# Patient Record
Sex: MALE | Race: WHITE | Employment: OTHER | URBAN - METROPOLITAN AREA
[De-identification: names, ages, dates, MRNs, and addresses within clinical notes are randomized per-mention and may not be internally consistent; named-entity substitution may affect disease eponyms.]

---

## 2017-01-06 ENCOUNTER — IMPORTED ENCOUNTER (OUTPATIENT)
Dept: URBAN - METROPOLITAN AREA CLINIC 9 | Facility: CLINIC | Age: 72
End: 2017-01-06

## 2017-02-03 ENCOUNTER — IMPORTED ENCOUNTER (OUTPATIENT)
Dept: URBAN - METROPOLITAN AREA CLINIC 9 | Facility: CLINIC | Age: 72
End: 2017-02-03

## 2017-03-02 ENCOUNTER — IMPORTED ENCOUNTER (OUTPATIENT)
Dept: URBAN - METROPOLITAN AREA CLINIC 9 | Facility: CLINIC | Age: 72
End: 2017-03-02

## 2017-03-03 ENCOUNTER — IMPORTED ENCOUNTER (OUTPATIENT)
Dept: URBAN - METROPOLITAN AREA CLINIC 9 | Facility: CLINIC | Age: 72
End: 2017-03-03

## 2017-04-19 ENCOUNTER — IMPORTED ENCOUNTER (OUTPATIENT)
Dept: URBAN - METROPOLITAN AREA CLINIC 9 | Facility: CLINIC | Age: 72
End: 2017-04-19

## 2017-05-17 ENCOUNTER — IMPORTED ENCOUNTER (OUTPATIENT)
Dept: URBAN - METROPOLITAN AREA CLINIC 9 | Facility: CLINIC | Age: 72
End: 2017-05-17

## 2017-06-08 ENCOUNTER — IMPORTED ENCOUNTER (OUTPATIENT)
Dept: URBAN - METROPOLITAN AREA CLINIC 9 | Facility: CLINIC | Age: 72
End: 2017-06-08

## 2017-06-14 ENCOUNTER — IMPORTED ENCOUNTER (OUTPATIENT)
Dept: URBAN - METROPOLITAN AREA CLINIC 9 | Facility: CLINIC | Age: 72
End: 2017-06-14

## 2017-07-12 ENCOUNTER — IMPORTED ENCOUNTER (OUTPATIENT)
Dept: URBAN - METROPOLITAN AREA CLINIC 9 | Facility: CLINIC | Age: 72
End: 2017-07-12

## 2017-08-09 ENCOUNTER — IMPORTED ENCOUNTER (OUTPATIENT)
Dept: URBAN - METROPOLITAN AREA CLINIC 9 | Facility: CLINIC | Age: 72
End: 2017-08-09

## 2017-09-13 ENCOUNTER — IMPORTED ENCOUNTER (OUTPATIENT)
Dept: URBAN - METROPOLITAN AREA CLINIC 9 | Facility: CLINIC | Age: 72
End: 2017-09-13

## 2017-10-11 ENCOUNTER — IMPORTED ENCOUNTER (OUTPATIENT)
Dept: URBAN - METROPOLITAN AREA CLINIC 9 | Facility: CLINIC | Age: 72
End: 2017-10-11

## 2017-10-12 ENCOUNTER — IMPORTED ENCOUNTER (OUTPATIENT)
Dept: URBAN - METROPOLITAN AREA CLINIC 9 | Facility: CLINIC | Age: 72
End: 2017-10-12

## 2017-11-08 ENCOUNTER — IMPORTED ENCOUNTER (OUTPATIENT)
Dept: URBAN - METROPOLITAN AREA CLINIC 9 | Facility: CLINIC | Age: 72
End: 2017-11-08

## 2017-12-06 ENCOUNTER — IMPORTED ENCOUNTER (OUTPATIENT)
Dept: URBAN - METROPOLITAN AREA CLINIC 9 | Facility: CLINIC | Age: 72
End: 2017-12-06

## 2018-01-10 ENCOUNTER — IMPORTED ENCOUNTER (OUTPATIENT)
Dept: URBAN - METROPOLITAN AREA CLINIC 9 | Facility: CLINIC | Age: 73
End: 2018-01-10

## 2018-02-07 ENCOUNTER — IMPORTED ENCOUNTER (OUTPATIENT)
Dept: URBAN - METROPOLITAN AREA CLINIC 9 | Facility: CLINIC | Age: 73
End: 2018-02-07

## 2018-03-07 ENCOUNTER — IMPORTED ENCOUNTER (OUTPATIENT)
Dept: URBAN - METROPOLITAN AREA CLINIC 9 | Facility: CLINIC | Age: 73
End: 2018-03-07

## 2018-04-04 ENCOUNTER — IMPORTED ENCOUNTER (OUTPATIENT)
Dept: URBAN - METROPOLITAN AREA CLINIC 9 | Facility: CLINIC | Age: 73
End: 2018-04-04

## 2018-04-12 ENCOUNTER — IMPORTED ENCOUNTER (OUTPATIENT)
Dept: URBAN - METROPOLITAN AREA CLINIC 9 | Facility: CLINIC | Age: 73
End: 2018-04-12

## 2018-05-02 ENCOUNTER — IMPORTED ENCOUNTER (OUTPATIENT)
Dept: URBAN - METROPOLITAN AREA CLINIC 9 | Facility: CLINIC | Age: 73
End: 2018-05-02

## 2018-05-30 ENCOUNTER — IMPORTED ENCOUNTER (OUTPATIENT)
Dept: URBAN - METROPOLITAN AREA CLINIC 9 | Facility: CLINIC | Age: 73
End: 2018-05-30

## 2018-07-13 ENCOUNTER — IMPORTED ENCOUNTER (OUTPATIENT)
Dept: URBAN - METROPOLITAN AREA CLINIC 9 | Facility: CLINIC | Age: 73
End: 2018-07-13

## 2018-08-10 ENCOUNTER — IMPORTED ENCOUNTER (OUTPATIENT)
Dept: URBAN - METROPOLITAN AREA CLINIC 9 | Facility: CLINIC | Age: 73
End: 2018-08-10

## 2018-09-07 ENCOUNTER — IMPORTED ENCOUNTER (OUTPATIENT)
Dept: URBAN - METROPOLITAN AREA CLINIC 9 | Facility: CLINIC | Age: 73
End: 2018-09-07

## 2018-09-18 NOTE — PATIENT DISCUSSION
New Prescription: Besivance (besifloxacin): drops,suspension: 0.6% 1 drop every two hours while awake into right eye 09-

## 2018-09-18 NOTE — PATIENT DISCUSSION
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 START BESIVANCE OD Q 2 HOURS. MIGHT NEED TO START VALTREX/ZIRGAN IN 2 DAYS IF NO BETTER.

## 2018-09-20 NOTE — PATIENT DISCUSSION
Continue: Besivance (besifloxacin): drops,suspension: 0.6% 1 drop every two hours while awake into right eye 09-

## 2018-10-04 NOTE — PATIENT DISCUSSION
Surgery Drop Counseling:  I have prescribed Imprimis prednisolone acetate-gatifloxacin-bromfenac combination drop for use as directed before and after cataract surgery.

## 2018-10-04 NOTE — PATIENT DISCUSSION
PATIENT DESIRES STANDARD LENS OD FOR CAT SX. PATIENT UNDERSTANDS HE WILL NEED GLASSES FOR DISTANCE DUE TO UNCORRECTED ASTIGMATISM AND FOR READING.

## 2018-10-04 NOTE — PATIENT DISCUSSION
Surgery Counseling: I have discussed the option of scheduling cataract surgery versus following, as well as the risks, benefits and alternatives of surgery with the patient. It was explained that the surgery is elective, there is no rush and there is no harm in waiting to have surgery. It was also explained that there is no guarantee that removing the cataract will improve their vision. The patient understands and desires to proceed with cataract surgery with implantation of an intraocular lens  to improve vision for driving.

## 2018-10-05 ENCOUNTER — IMPORTED ENCOUNTER (OUTPATIENT)
Dept: URBAN - METROPOLITAN AREA CLINIC 9 | Facility: CLINIC | Age: 73
End: 2018-10-05

## 2018-10-25 NOTE — PATIENT DISCUSSION
LEFT EYE CATARACT SURGERY - PRE-OP INSTRUCTIONS:  I have reviewed the indications, alternatives, and risks of the procedure with the patient. These risks include partial or total loss of vision, infection in the eye, pain, and the need for additional surgery for complications including a broken posterior capsule, inability to completely remove the cataract, dropped nucleus, infection, or retinal detachment. The patient has given informed consent by signing the standard consent form. We will proceed with cataract surgery as planned. The patient has also been instructed in the usual pre-operative regimen including the need to use the antibiotic (Vigamox/Ofloxacin) drops 4 times a day starting 3 days prior to surgery, and Prolensa 1 time a day starting 3 days prior to surgery (7 days before for diabetic patients), and the need to avoid eating or drinking anything except prescription medications after midnight on the morning of surgery. The patient was given the pre-printed Cataract Surgery Instructions' handout. The contents were carefully reviewed with the patient.

## 2018-10-25 NOTE — PATIENT DISCUSSION
Pre-Op 2nd Eye Counseling: The patient has noticed an improvement in their visual symptoms in the operative eye. The patient complains of decreased vision in the fellow eye when driving. It was explained to the patient that the decision to proceed with cataract surgery in the fellow eye is entirely a separate decision from the surgical eye. All of the same risks, benefits and alternatives ere reviewed with the patient again. The patient does feel the vision in the non-operative eye is limiting their daily activities and elects to proceed with cataract surgery in the left eye.

## 2018-10-25 NOTE — PATIENT DISCUSSION
Continue: Besivance (besifloxacin): drops,suspension: 0.6% 1 drop four times a day into right eye 10-

## 2018-10-29 NOTE — PATIENT DISCUSSION
Continue: prednisolone acetate (prednisolone acetate): drops,suspension: 1% 1 drop four times a day into right eye 10-

## 2018-11-21 ENCOUNTER — IMPORTED ENCOUNTER (OUTPATIENT)
Dept: URBAN - METROPOLITAN AREA CLINIC 9 | Facility: CLINIC | Age: 73
End: 2018-11-21

## 2018-11-29 ENCOUNTER — IMPORTED ENCOUNTER (OUTPATIENT)
Dept: URBAN - METROPOLITAN AREA CLINIC 9 | Facility: CLINIC | Age: 73
End: 2018-11-29

## 2019-01-02 ENCOUNTER — IMPORTED ENCOUNTER (OUTPATIENT)
Dept: URBAN - METROPOLITAN AREA CLINIC 9 | Facility: CLINIC | Age: 74
End: 2019-01-02

## 2019-02-13 ENCOUNTER — IMPORTED ENCOUNTER (OUTPATIENT)
Dept: URBAN - METROPOLITAN AREA CLINIC 9 | Facility: CLINIC | Age: 74
End: 2019-02-13

## 2019-04-10 ENCOUNTER — IMPORTED ENCOUNTER (OUTPATIENT)
Dept: URBAN - METROPOLITAN AREA CLINIC 9 | Facility: CLINIC | Age: 74
End: 2019-04-10

## 2019-04-25 ENCOUNTER — IMPORTED ENCOUNTER (OUTPATIENT)
Dept: URBAN - METROPOLITAN AREA CLINIC 9 | Facility: CLINIC | Age: 74
End: 2019-04-25

## 2019-06-05 ENCOUNTER — IMPORTED ENCOUNTER (OUTPATIENT)
Dept: URBAN - METROPOLITAN AREA CLINIC 9 | Facility: CLINIC | Age: 74
End: 2019-06-05

## 2019-07-31 ENCOUNTER — IMPORTED ENCOUNTER (OUTPATIENT)
Dept: URBAN - METROPOLITAN AREA CLINIC 9 | Facility: CLINIC | Age: 74
End: 2019-07-31

## 2019-09-12 ENCOUNTER — IMPORTED ENCOUNTER (OUTPATIENT)
Dept: URBAN - METROPOLITAN AREA CLINIC 9 | Facility: CLINIC | Age: 74
End: 2019-09-12

## 2019-09-25 ENCOUNTER — IMPORTED ENCOUNTER (OUTPATIENT)
Dept: URBAN - METROPOLITAN AREA CLINIC 9 | Facility: CLINIC | Age: 74
End: 2019-09-25

## 2019-11-05 NOTE — PATIENT DISCUSSION
Continue: prednisolone acetate (prednisolone acetate): drops,suspension: 1% 1 drop four times a day into right eye 10- family, neurology, Psych,PMD

## 2019-11-20 ENCOUNTER — IMPORTED ENCOUNTER (OUTPATIENT)
Dept: URBAN - METROPOLITAN AREA CLINIC 9 | Facility: CLINIC | Age: 74
End: 2019-11-20

## 2020-01-29 ENCOUNTER — IMPORTED ENCOUNTER (OUTPATIENT)
Dept: URBAN - METROPOLITAN AREA CLINIC 9 | Facility: CLINIC | Age: 75
End: 2020-01-29

## 2020-01-30 ENCOUNTER — IMPORTED ENCOUNTER (OUTPATIENT)
Dept: URBAN - METROPOLITAN AREA CLINIC 9 | Facility: CLINIC | Age: 75
End: 2020-01-30

## 2020-03-25 ENCOUNTER — IMPORTED ENCOUNTER (OUTPATIENT)
Dept: URBAN - METROPOLITAN AREA CLINIC 9 | Facility: CLINIC | Age: 75
End: 2020-03-25

## 2020-05-20 ENCOUNTER — IMPORTED ENCOUNTER (OUTPATIENT)
Dept: URBAN - METROPOLITAN AREA CLINIC 9 | Facility: CLINIC | Age: 75
End: 2020-05-20

## 2020-07-15 ENCOUNTER — IMPORTED ENCOUNTER (OUTPATIENT)
Dept: URBAN - METROPOLITAN AREA CLINIC 9 | Facility: CLINIC | Age: 75
End: 2020-07-15

## 2020-09-23 ENCOUNTER — IMPORTED ENCOUNTER (OUTPATIENT)
Dept: URBAN - METROPOLITAN AREA CLINIC 9 | Facility: CLINIC | Age: 75
End: 2020-09-23

## 2020-11-18 ENCOUNTER — IMPORTED ENCOUNTER (OUTPATIENT)
Dept: URBAN - METROPOLITAN AREA CLINIC 9 | Facility: CLINIC | Age: 75
End: 2020-11-18

## 2021-01-13 ENCOUNTER — IMPORTED ENCOUNTER (OUTPATIENT)
Dept: URBAN - METROPOLITAN AREA CLINIC 9 | Facility: CLINIC | Age: 76
End: 2021-01-13

## 2021-03-10 ENCOUNTER — IMPORTED ENCOUNTER (OUTPATIENT)
Dept: URBAN - METROPOLITAN AREA CLINIC 9 | Facility: CLINIC | Age: 76
End: 2021-03-10

## 2021-05-05 ENCOUNTER — IMPORTED ENCOUNTER (OUTPATIENT)
Dept: URBAN - METROPOLITAN AREA CLINIC 9 | Facility: CLINIC | Age: 76
End: 2021-05-05

## 2021-06-30 ENCOUNTER — IMPORTED ENCOUNTER (OUTPATIENT)
Dept: URBAN - METROPOLITAN AREA CLINIC 9 | Facility: CLINIC | Age: 76
End: 2021-06-30

## 2021-08-25 ENCOUNTER — IMPORTED ENCOUNTER (OUTPATIENT)
Dept: URBAN - METROPOLITAN AREA CLINIC 9 | Facility: CLINIC | Age: 76
End: 2021-08-25

## 2021-09-02 ENCOUNTER — IMPORTED ENCOUNTER (OUTPATIENT)
Dept: URBAN - METROPOLITAN AREA CLINIC 9 | Facility: CLINIC | Age: 76
End: 2021-09-02

## 2021-09-02 PROBLEM — H34.8121: Noted: 2021-09-02

## 2021-09-02 PROBLEM — H18.12: Noted: 2021-09-02

## 2021-10-19 ASSESSMENT — TONOMETRY
OD_IOP_MMHG: 12
OS_IOP_MMHG: 20
OS_IOP_MMHG: 14
OS_IOP_MMHG: 16
OS_IOP_MMHG: 16
OS_IOP_MMHG: 18
OD_IOP_MMHG: 11
OS_IOP_MMHG: 12
OS_IOP_MMHG: 20
OD_IOP_MMHG: 18
OS_IOP_MMHG: 16
OS_IOP_MMHG: 25
OD_IOP_MMHG: 12
OS_IOP_MMHG: 13
OS_IOP_MMHG: 13
OD_IOP_MMHG: 14
OS_IOP_MMHG: 20
OS_IOP_MMHG: 15
OD_IOP_MMHG: 12
OD_IOP_MMHG: 14
OD_IOP_MMHG: 16
OD_IOP_MMHG: 13
OD_IOP_MMHG: 11
OS_IOP_MMHG: 35
OD_IOP_MMHG: 18
OS_IOP_MMHG: 15
OS_IOP_MMHG: 18
OS_IOP_MMHG: 17
OD_IOP_MMHG: 15
OS_IOP_MMHG: 14
OS_IOP_MMHG: 14
OD_IOP_MMHG: 16
OD_IOP_MMHG: 15
OS_IOP_MMHG: 11
OD_IOP_MMHG: 11
OS_IOP_MMHG: 15
OD_IOP_MMHG: 16
OS_IOP_MMHG: 15
OD_IOP_MMHG: 13
OD_IOP_MMHG: 14
OD_IOP_MMHG: 22
OD_IOP_MMHG: 16
OD_IOP_MMHG: 12
OD_IOP_MMHG: 10
OD_IOP_MMHG: 13
OD_IOP_MMHG: 14
OD_IOP_MMHG: 14
OD_IOP_MMHG: 10
OS_IOP_MMHG: 22
OS_IOP_MMHG: 14
OS_IOP_MMHG: 14
OS_IOP_MMHG: 21
OD_IOP_MMHG: 6
OD_IOP_MMHG: 22
OD_IOP_MMHG: 14
OD_IOP_MMHG: 10
OS_IOP_MMHG: 17
OD_IOP_MMHG: 9
OD_IOP_MMHG: 10
OS_IOP_MMHG: 14
OD_IOP_MMHG: 12
OD_IOP_MMHG: 18
OD_IOP_MMHG: 14
OS_IOP_MMHG: 15
OS_IOP_MMHG: 19
OD_IOP_MMHG: 18
OD_IOP_MMHG: 13
OD_IOP_MMHG: 10
OD_IOP_MMHG: 12
OS_IOP_MMHG: 20
OD_IOP_MMHG: 14
OD_IOP_MMHG: 9
OS_IOP_MMHG: 25
OS_IOP_MMHG: 32
OD_IOP_MMHG: 14
OD_IOP_MMHG: 13
OS_IOP_MMHG: 16
OD_IOP_MMHG: 15
OD_IOP_MMHG: 12
OD_IOP_MMHG: 13
OS_IOP_MMHG: 18
OS_IOP_MMHG: 20
OD_IOP_MMHG: 13
OD_IOP_MMHG: 10
OS_IOP_MMHG: 20
OS_IOP_MMHG: 16
OS_IOP_MMHG: 18
OS_IOP_MMHG: 30
OS_IOP_MMHG: 17
OS_IOP_MMHG: 18
OD_IOP_MMHG: 11
OD_IOP_MMHG: 9
OS_IOP_MMHG: 21
OS_IOP_MMHG: 15
OS_IOP_MMHG: 15
OS_IOP_MMHG: 13
OS_IOP_MMHG: 18
OS_IOP_MMHG: 21
OS_IOP_MMHG: 13
OS_IOP_MMHG: 17

## 2021-10-19 ASSESSMENT — VISUAL ACUITY
OD_PH: 20/40 + SN
OD_CC: 20/30 -2 SN
OD_CC: 20/25 -2 SN
OD_CC: 20/30 - SN
OD_CC: 20/50 -2 SN
OD_CC: 20/50 + SN
OD_CC: 20/30 - SN
OD_PH: 20/40 + SN
OD_SC: 20/100 SN
OD_CC: 20/20 - SN
OD_CC: 20/40 SN
OD_CC: 20/40 -2 SN
OD_CC: 20/40 -2 SN
OD_CC: 20/25 -2 SN
OD_CC: 20/50 + SN
OD_PH: 20/30 - SN
OD_CC: 20/20 -2 SN
OD_CC: 20/40 -2 SN
OD_PH: 20/30 -2 SN
OD_CC: 20/50 - SN
OD_PH: 20/40 -2 SN
OD_CC: 20/20 -2 SN
OD_CC: 20/25 - SN
OD_CC: 20/25 -2 SN
OD_PH: 20/30 -2 SN
OD_CC: 20/30 - SN
OD_CC: 20/30 SN
OD_PH: 20/30 SN
OD_CC: 20/30 - SN
OD_PH: 20/40 +2 SN
OD_CC: 20/25 - SN
OD_CC: 20/40 SN
OD_CC: 20/25 +2 SN
OD_CC: 20/30 -2 SN
OD_CC: 20/30 +2 SN
OD_PH: 20/40 SN
OD_CC: 20/30 SN
OD_PH: 20/30 -2 SN
OD_CC: 20/25 - SN
OD_CC: 20/50 - SN
OD_CC: 20/30 -2 SN
OD_CC: 20/50 - SN
OD_PH: 20/30 SN
OD_CC: 20/30 + SN
OD_CC: 20/50 +2 SN
OD_CC: 20/40 - SN
OD_CC: 20/20 -2 SN
OD_CC: 20/25 - SN
OD_CC: 20/20 SN
OD_PH: 20/40 +2 SN
OD_CC: 20/40 -2 SN
OD_CC: 20/25 SN
OD_PH: 20/40 - SN
OD_CC: 20/20 - SN
OD_PH: 20/30 -2 SN
OD_CC: 20/25 - SN
OD_CC: 20/30 -2 SN
OD_PH: 20/50 SN
OD_CC: 20/25 + SN
OD_CC: 20/30 + SN
OD_PH: 20/30 - SN
OD_CC: 20/20 SN
OD_CC: 20/30 -2 SN
OD_CC: 20/40 -2 SN
OD_CC: 20/40 SN
OD_CC: 20/30 -2 SN
OD_CC: 20/30 + SN
OD_CC: 20/20 SN

## 2021-11-01 ENCOUNTER — PREPPED CHART (OUTPATIENT)
Dept: URBAN - METROPOLITAN AREA CLINIC 9 | Facility: CLINIC | Age: 76
End: 2021-11-01

## 2021-11-03 ENCOUNTER — CLINIC PROCEDURE ONLY (OUTPATIENT)
Dept: URBAN - METROPOLITAN AREA CLINIC 9 | Facility: CLINIC | Age: 76
End: 2021-11-03

## 2021-11-03 DIAGNOSIS — H34.8121: ICD-10-CM

## 2021-11-03 DIAGNOSIS — H34.8310: ICD-10-CM

## 2021-11-03 DIAGNOSIS — H27.132: ICD-10-CM

## 2021-11-03 DIAGNOSIS — H35.033: ICD-10-CM

## 2021-11-03 DIAGNOSIS — H35.81: ICD-10-CM

## 2021-11-03 PROCEDURE — 92134 CPTRZ OPH DX IMG PST SGM RTA: CPT

## 2021-11-03 PROCEDURE — 67028 INJECTION EYE DRUG: CPT

## 2021-11-03 PROCEDURE — 92014 COMPRE OPH EXAM EST PT 1/>: CPT

## 2021-11-03 ASSESSMENT — VISUAL ACUITY: OD_CC: 20/30

## 2021-11-03 ASSESSMENT — TONOMETRY
OD_IOP_MMHG: 25
OS_IOP_MMHG: 28

## 2021-12-15 ENCOUNTER — ESTABLISHED PATIENT (OUTPATIENT)
Dept: URBAN - METROPOLITAN AREA CLINIC 9 | Facility: CLINIC | Age: 76
End: 2021-12-15

## 2021-12-15 DIAGNOSIS — H35.81: ICD-10-CM

## 2021-12-15 DIAGNOSIS — H34.8121: ICD-10-CM

## 2021-12-15 DIAGNOSIS — H34.8310: ICD-10-CM

## 2021-12-15 DIAGNOSIS — H27.132: ICD-10-CM

## 2021-12-15 PROCEDURE — 92134 CPTRZ OPH DX IMG PST SGM RTA: CPT

## 2021-12-15 PROCEDURE — 92014 COMPRE OPH EXAM EST PT 1/>: CPT

## 2021-12-15 PROCEDURE — 67028 INJECTION EYE DRUG: CPT

## 2021-12-15 ASSESSMENT — VISUAL ACUITY: OD_SC: 20/30-1

## 2021-12-15 ASSESSMENT — TONOMETRY
OS_IOP_MMHG: 18
OD_IOP_MMHG: 20

## 2022-02-23 ENCOUNTER — ESTABLISHED PATIENT (OUTPATIENT)
Dept: URBAN - METROPOLITAN AREA CLINIC 9 | Facility: CLINIC | Age: 77
End: 2022-02-23

## 2022-02-23 DIAGNOSIS — H34.8310: ICD-10-CM

## 2022-02-23 DIAGNOSIS — H35.033: ICD-10-CM

## 2022-02-23 DIAGNOSIS — H34.8121: ICD-10-CM

## 2022-02-23 DIAGNOSIS — H40.1133: ICD-10-CM

## 2022-02-23 DIAGNOSIS — H35.81: ICD-10-CM

## 2022-02-23 DIAGNOSIS — H27.132: ICD-10-CM

## 2022-02-23 PROCEDURE — 92134 CPTRZ OPH DX IMG PST SGM RTA: CPT

## 2022-02-23 PROCEDURE — 67028 INJECTION EYE DRUG: CPT

## 2022-02-23 PROCEDURE — 92014 COMPRE OPH EXAM EST PT 1/>: CPT

## 2022-02-23 ASSESSMENT — TONOMETRY
OS_IOP_MMHG: 22
OD_IOP_MMHG: 23

## 2022-02-23 ASSESSMENT — VISUAL ACUITY: OD_CC: 20/30+1

## 2022-05-18 ENCOUNTER — ESTABLISHED PATIENT (OUTPATIENT)
Dept: URBAN - METROPOLITAN AREA CLINIC 9 | Facility: CLINIC | Age: 77
End: 2022-05-18

## 2022-05-18 DIAGNOSIS — H40.1133: ICD-10-CM

## 2022-05-18 DIAGNOSIS — H35.033: ICD-10-CM

## 2022-05-18 DIAGNOSIS — H34.8310: ICD-10-CM

## 2022-05-18 DIAGNOSIS — H27.132: ICD-10-CM

## 2022-05-18 DIAGNOSIS — H35.81: ICD-10-CM

## 2022-05-18 DIAGNOSIS — H34.8121: ICD-10-CM

## 2022-05-18 PROCEDURE — 92134 CPTRZ OPH DX IMG PST SGM RTA: CPT

## 2022-05-18 PROCEDURE — 92014 COMPRE OPH EXAM EST PT 1/>: CPT

## 2022-05-18 PROCEDURE — 67028 INJECTION EYE DRUG: CPT

## 2022-05-18 ASSESSMENT — TONOMETRY
OD_IOP_MMHG: 23
OS_IOP_MMHG: 24

## 2022-05-18 ASSESSMENT — VISUAL ACUITY: OD_CC: 20/40

## 2022-08-10 ENCOUNTER — ESTABLISHED PATIENT (OUTPATIENT)
Dept: URBAN - METROPOLITAN AREA CLINIC 9 | Facility: CLINIC | Age: 77
End: 2022-08-10

## 2022-08-10 DIAGNOSIS — H35.033: ICD-10-CM

## 2022-08-10 DIAGNOSIS — H34.8121: ICD-10-CM

## 2022-08-10 DIAGNOSIS — H34.8310: ICD-10-CM

## 2022-08-10 DIAGNOSIS — H35.81: ICD-10-CM

## 2022-08-10 DIAGNOSIS — H27.132: ICD-10-CM

## 2022-08-10 PROCEDURE — 92014 COMPRE OPH EXAM EST PT 1/>: CPT

## 2022-08-10 PROCEDURE — 67028 INJECTION EYE DRUG: CPT

## 2022-08-10 PROCEDURE — 92134 CPTRZ OPH DX IMG PST SGM RTA: CPT

## 2022-08-10 ASSESSMENT — TONOMETRY
OD_IOP_MMHG: 12
OS_IOP_MMHG: 11

## 2022-08-10 ASSESSMENT — VISUAL ACUITY: OD_CC: 20/40-1

## 2022-11-02 ENCOUNTER — ESTABLISHED PATIENT (OUTPATIENT)
Dept: URBAN - METROPOLITAN AREA CLINIC 9 | Facility: CLINIC | Age: 77
End: 2022-11-02

## 2022-11-02 DIAGNOSIS — H34.8310: ICD-10-CM

## 2022-11-02 DIAGNOSIS — H27.132: ICD-10-CM

## 2022-11-02 DIAGNOSIS — H35.81: ICD-10-CM

## 2022-11-02 DIAGNOSIS — H35.033: ICD-10-CM

## 2022-11-02 DIAGNOSIS — H34.8121: ICD-10-CM

## 2022-11-02 PROCEDURE — 92014 COMPRE OPH EXAM EST PT 1/>: CPT

## 2022-11-02 PROCEDURE — 92134 CPTRZ OPH DX IMG PST SGM RTA: CPT

## 2022-11-02 PROCEDURE — 67028 INJECTION EYE DRUG: CPT

## 2022-11-02 ASSESSMENT — TONOMETRY
OS_IOP_MMHG: 16
OD_IOP_MMHG: 19

## 2022-11-02 ASSESSMENT — VISUAL ACUITY: OD_SC: 20/40+1

## 2023-01-25 ENCOUNTER — ESTABLISHED PATIENT (OUTPATIENT)
Dept: URBAN - METROPOLITAN AREA CLINIC 9 | Facility: CLINIC | Age: 78
End: 2023-01-25

## 2023-01-25 DIAGNOSIS — H34.8121: ICD-10-CM

## 2023-01-25 DIAGNOSIS — H27.132: ICD-10-CM

## 2023-01-25 DIAGNOSIS — H34.8310: ICD-10-CM

## 2023-01-25 DIAGNOSIS — H35.033: ICD-10-CM

## 2023-01-25 DIAGNOSIS — H35.81: ICD-10-CM

## 2023-01-25 DIAGNOSIS — H40.1133: ICD-10-CM

## 2023-01-25 PROCEDURE — 67028 INJECTION EYE DRUG: CPT

## 2023-01-25 PROCEDURE — 92134 CPTRZ OPH DX IMG PST SGM RTA: CPT

## 2023-01-25 PROCEDURE — 92014 COMPRE OPH EXAM EST PT 1/>: CPT

## 2023-01-25 ASSESSMENT — VISUAL ACUITY: OD_CC: 20/40-1

## 2023-01-25 ASSESSMENT — TONOMETRY
OS_IOP_MMHG: 20
OD_IOP_MMHG: 16

## 2023-04-19 ENCOUNTER — ESTABLISHED PATIENT (OUTPATIENT)
Dept: URBAN - METROPOLITAN AREA CLINIC 9 | Facility: CLINIC | Age: 78
End: 2023-04-19

## 2023-04-19 DIAGNOSIS — H34.8310: ICD-10-CM

## 2023-04-19 DIAGNOSIS — H35.033: ICD-10-CM

## 2023-04-19 DIAGNOSIS — H27.132: ICD-10-CM

## 2023-04-19 DIAGNOSIS — H35.81: ICD-10-CM

## 2023-04-19 DIAGNOSIS — H34.8121: ICD-10-CM

## 2023-04-19 DIAGNOSIS — H40.1133: ICD-10-CM

## 2023-04-19 PROCEDURE — 92134 CPTRZ OPH DX IMG PST SGM RTA: CPT

## 2023-04-19 PROCEDURE — 67028 INJECTION EYE DRUG: CPT

## 2023-04-19 PROCEDURE — 92014 COMPRE OPH EXAM EST PT 1/>: CPT

## 2023-04-19 ASSESSMENT — TONOMETRY
OS_IOP_MMHG: 19
OD_IOP_MMHG: 16

## 2023-04-19 ASSESSMENT — VISUAL ACUITY: OD_CC: 20/50-2

## 2023-06-28 ENCOUNTER — ESTABLISHED PATIENT (OUTPATIENT)
Dept: URBAN - METROPOLITAN AREA CLINIC 9 | Facility: CLINIC | Age: 78
End: 2023-06-28

## 2023-06-28 DIAGNOSIS — H35.033: ICD-10-CM

## 2023-06-28 DIAGNOSIS — H34.8121: ICD-10-CM

## 2023-06-28 DIAGNOSIS — H34.8310: ICD-10-CM

## 2023-06-28 DIAGNOSIS — H27.132: ICD-10-CM

## 2023-06-28 DIAGNOSIS — H40.1133: ICD-10-CM

## 2023-06-28 DIAGNOSIS — H35.81: ICD-10-CM

## 2023-06-28 PROCEDURE — 92134 CPTRZ OPH DX IMG PST SGM RTA: CPT

## 2023-06-28 PROCEDURE — 92014 COMPRE OPH EXAM EST PT 1/>: CPT

## 2023-06-28 PROCEDURE — 67028 INJECTION EYE DRUG: CPT

## 2023-06-28 ASSESSMENT — VISUAL ACUITY: OD_CC: 20/50-1

## 2023-06-28 ASSESSMENT — TONOMETRY
OS_IOP_MMHG: 16
OD_IOP_MMHG: 16

## 2023-07-07 ENCOUNTER — ESTABLISHED PATIENT (OUTPATIENT)
Dept: URBAN - METROPOLITAN AREA CLINIC 9 | Facility: CLINIC | Age: 78
End: 2023-07-07

## 2023-07-07 DIAGNOSIS — H52.221: ICD-10-CM

## 2023-07-07 DIAGNOSIS — H40.1133: ICD-10-CM

## 2023-07-07 DIAGNOSIS — H18.12: ICD-10-CM

## 2023-07-07 DIAGNOSIS — H04.123: ICD-10-CM

## 2023-07-07 PROCEDURE — 92133 CPTRZD OPH DX IMG PST SGM ON: CPT

## 2023-07-07 PROCEDURE — 92015 DETERMINE REFRACTIVE STATE: CPT

## 2023-07-07 PROCEDURE — 92014 COMPRE OPH EXAM EST PT 1/>: CPT

## 2023-07-07 ASSESSMENT — VISUAL ACUITY
OD_CC: 20/50
OU_CC: 20/50+2
OD_GLARE: 20/40

## 2023-07-07 ASSESSMENT — KERATOMETRY
OD_K2POWER_DIOPTERS: 46.50
OD_AXISANGLE_DEGREES: 76
OD_K1POWER_DIOPTERS: 43.75
OD_AXISANGLE2_DEGREES: 166

## 2023-07-07 ASSESSMENT — TONOMETRY
OD_IOP_MMHG: 13
OS_IOP_MMHG: 15

## 2023-09-20 ENCOUNTER — ESTABLISHED PATIENT (OUTPATIENT)
Dept: URBAN - METROPOLITAN AREA CLINIC 9 | Facility: CLINIC | Age: 78
End: 2023-09-20

## 2023-09-20 DIAGNOSIS — H27.132: ICD-10-CM

## 2023-09-20 DIAGNOSIS — H35.033: ICD-10-CM

## 2023-09-20 DIAGNOSIS — H40.1133: ICD-10-CM

## 2023-09-20 DIAGNOSIS — H34.8121: ICD-10-CM

## 2023-09-20 DIAGNOSIS — H35.81: ICD-10-CM

## 2023-09-20 DIAGNOSIS — H34.8310: ICD-10-CM

## 2023-09-20 PROCEDURE — 67028 INJECTION EYE DRUG: CPT

## 2023-09-20 PROCEDURE — 99214 OFFICE O/P EST MOD 30 MIN: CPT

## 2023-09-20 PROCEDURE — 92134 CPTRZ OPH DX IMG PST SGM RTA: CPT

## 2023-09-20 ASSESSMENT — VISUAL ACUITY: OD_CC: 20/40+1

## 2023-09-20 ASSESSMENT — TONOMETRY
OD_IOP_MMHG: 10
OS_IOP_MMHG: 13

## 2023-12-08 ENCOUNTER — TECH ONLY (OUTPATIENT)
Facility: LOCATION | Age: 78
End: 2023-12-08

## 2023-12-08 DIAGNOSIS — H40.1133: ICD-10-CM

## 2023-12-08 PROCEDURE — 92083 EXTENDED VISUAL FIELD XM: CPT

## 2023-12-13 ENCOUNTER — ESTABLISHED PATIENT (OUTPATIENT)
Facility: LOCATION | Age: 78
End: 2023-12-13

## 2023-12-13 DIAGNOSIS — H35.81: ICD-10-CM

## 2023-12-13 DIAGNOSIS — H34.8121: ICD-10-CM

## 2023-12-13 DIAGNOSIS — H27.132: ICD-10-CM

## 2023-12-13 DIAGNOSIS — H34.8310: ICD-10-CM

## 2023-12-13 DIAGNOSIS — H40.1133: ICD-10-CM

## 2023-12-13 DIAGNOSIS — H18.12: ICD-10-CM

## 2023-12-13 DIAGNOSIS — H35.033: ICD-10-CM

## 2023-12-13 PROCEDURE — 67028 INJECTION EYE DRUG: CPT

## 2023-12-13 PROCEDURE — 99214 OFFICE O/P EST MOD 30 MIN: CPT

## 2023-12-13 PROCEDURE — 92134 CPTRZ OPH DX IMG PST SGM RTA: CPT

## 2023-12-13 ASSESSMENT — VISUAL ACUITY: OD_CC: 20/40-1

## 2023-12-13 ASSESSMENT — TONOMETRY
OS_IOP_MMHG: 12
OD_IOP_MMHG: 13

## 2024-03-20 ENCOUNTER — ESTABLISHED PATIENT (OUTPATIENT)
Facility: LOCATION | Age: 79
End: 2024-03-20

## 2024-03-20 DIAGNOSIS — H34.8121: ICD-10-CM

## 2024-03-20 DIAGNOSIS — H40.1133: ICD-10-CM

## 2024-03-20 DIAGNOSIS — H34.8310: ICD-10-CM

## 2024-03-20 DIAGNOSIS — H35.033: ICD-10-CM

## 2024-03-20 DIAGNOSIS — H35.81: ICD-10-CM

## 2024-03-20 DIAGNOSIS — H18.12: ICD-10-CM

## 2024-03-20 DIAGNOSIS — H27.132: ICD-10-CM

## 2024-03-20 PROCEDURE — 99214 OFFICE O/P EST MOD 30 MIN: CPT

## 2024-03-20 PROCEDURE — 92134 CPTRZ OPH DX IMG PST SGM RTA: CPT

## 2024-03-20 PROCEDURE — 67028 INJECTION EYE DRUG: CPT

## 2024-03-20 ASSESSMENT — TONOMETRY
OS_IOP_MMHG: 14
OD_IOP_MMHG: 12

## 2024-03-20 ASSESSMENT — VISUAL ACUITY: OD_CC: 20/40-1

## 2024-05-15 ENCOUNTER — ESTABLISHED PATIENT (OUTPATIENT)
Facility: LOCATION | Age: 79
End: 2024-05-15

## 2024-05-15 DIAGNOSIS — H35.81: ICD-10-CM

## 2024-05-15 DIAGNOSIS — H34.8121: ICD-10-CM

## 2024-05-15 DIAGNOSIS — H34.8310: ICD-10-CM

## 2024-05-15 PROCEDURE — 67028 INJECTION EYE DRUG: CPT

## 2024-05-15 PROCEDURE — 92134 CPTRZ OPH DX IMG PST SGM RTA: CPT

## 2024-05-15 ASSESSMENT — TONOMETRY
OD_IOP_MMHG: 17
OS_IOP_MMHG: 18

## 2024-05-15 ASSESSMENT — VISUAL ACUITY: OD_SC: 20/50-2

## 2024-07-10 ENCOUNTER — COMPREHENSIVE EXAM (OUTPATIENT)
Facility: LOCATION | Age: 79
End: 2024-07-10

## 2024-07-10 DIAGNOSIS — H35.81: ICD-10-CM

## 2024-07-10 DIAGNOSIS — H18.12: ICD-10-CM

## 2024-07-10 DIAGNOSIS — H34.8121: ICD-10-CM

## 2024-07-10 DIAGNOSIS — H27.132: ICD-10-CM

## 2024-07-10 DIAGNOSIS — H34.8310: ICD-10-CM

## 2024-07-10 DIAGNOSIS — H35.033: ICD-10-CM

## 2024-07-10 DIAGNOSIS — H40.1133: ICD-10-CM

## 2024-07-10 PROCEDURE — 67028 INJECTION EYE DRUG: CPT

## 2024-07-10 PROCEDURE — 99214 OFFICE O/P EST MOD 30 MIN: CPT | Mod: 25

## 2024-07-10 PROCEDURE — 92134 CPTRZ OPH DX IMG PST SGM RTA: CPT

## 2024-07-10 ASSESSMENT — VISUAL ACUITY: OD_CC: 20/60-1

## 2024-07-10 ASSESSMENT — TONOMETRY
OS_IOP_MMHG: 14
OD_IOP_MMHG: 18

## 2024-09-04 ENCOUNTER — CLINIC PROCEDURE ONLY (OUTPATIENT)
Facility: LOCATION | Age: 79
End: 2024-09-04

## 2024-09-04 DIAGNOSIS — H34.8121: ICD-10-CM

## 2024-09-04 DIAGNOSIS — H34.8310: ICD-10-CM

## 2024-09-04 DIAGNOSIS — H35.81: ICD-10-CM

## 2024-09-04 PROCEDURE — 67028 INJECTION EYE DRUG: CPT

## 2024-09-04 PROCEDURE — 92134 CPTRZ OPH DX IMG PST SGM RTA: CPT
